# Patient Record
Sex: MALE | Race: WHITE | NOT HISPANIC OR LATINO | URBAN - METROPOLITAN AREA
[De-identification: names, ages, dates, MRNs, and addresses within clinical notes are randomized per-mention and may not be internally consistent; named-entity substitution may affect disease eponyms.]

---

## 2017-08-30 ENCOUNTER — OFFICE (OUTPATIENT)
Dept: URBAN - METROPOLITAN AREA CLINIC 101 | Facility: CLINIC | Age: 62
End: 2017-08-30
Payer: COMMERCIAL

## 2017-08-30 VITALS
HEIGHT: 72 IN | WEIGHT: 205 LBS | SYSTOLIC BLOOD PRESSURE: 116 MMHG | DIASTOLIC BLOOD PRESSURE: 70 MMHG | HEART RATE: 75 BPM | TEMPERATURE: 98.1 F

## 2017-08-30 DIAGNOSIS — K59.09 OTHER CONSTIPATION: ICD-10-CM

## 2017-08-30 DIAGNOSIS — R63.4 ABNORMAL WEIGHT LOSS: ICD-10-CM

## 2017-08-30 DIAGNOSIS — R19.4 CHANGE IN BOWEL HABIT: ICD-10-CM

## 2017-08-30 PROCEDURE — 99214 OFFICE O/P EST MOD 30 MIN: CPT

## 2017-08-30 NOTE — SERVICEHPINOTES
This 60 YO patient is here for unexplained weight loss, altered bowel patterns, and nausea. He has unintentionally lost over 50 pounds in one year. He has lack of appetite, and eats one good meal a day. He has altered bowel patterns and has constipation and diarrhea. He has constipation 50% of the week takes Miralax has diarrhea 50% of the week. He has daily central abdominal , after he eats, 5 of 10. He denies rectal bleeding, family h/o colon cancer. He  had inguinal hernia repair with Dr Walton, and had a anal sphincterotomy and hemorrhoid in 2015. His last colonoscopy was 2011, no polyps, and a papilla noted. He has daily nausea, and has nausea after he eats. No heartburn, vomiting, epigastric pain. He has never had an EGD.

## 2017-09-04 LAB
CDIFF TOXIN ASSAY (PCR): CDIFF DNA PATIENT RESULT: NEGATIVE
GIARDIA ANTIGEN STOOL: NEGATIVE
GIARDIA ANTIGEN STOOL: PERFORMING LOCATION: (no result)
STOOL FOR WBC'S: NORMAL

## 2017-09-06 LAB
OVA + PARASITE EXAM - REF: OVA + PARASITE EXAM: NORMAL
OVA + PARASITE EXAM - REF: PERFORMING LOCATION: (no result)
OVA + PARASITE EXAM - REF: RESULT 1: NORMAL
STOOL CULTURE: (no result)
STOOL CULTURE: NORMAL

## 2017-09-07 LAB
FECAL FAT, QUALITATIVE - REF: FATS, NEUTRAL: NORMAL
FECAL FAT, QUALITATIVE - REF: FATS, TOTAL: NORMAL
PANCREATIC ELASTASE STOOL -REF: 426
PANCREATIC ELASTASE STOOL -REF: PERFORMING LOCATION: (no result)
TSH HIGH SENSITIVITY (3RD GEN): 4.41 MIU/ML
TSH HIGH SENSITIVITY (3RD GEN): PERFORMING LOCATION: (no result)

## 2017-09-11 LAB
CELIAC DISEASE COMP - REF: DEAMIDATED GLIADIN, IGA - REF: 3 UNITS
CELIAC DISEASE COMP - REF: DEAMIDATED GLIADIN, IGG - REF: 3 UNITS
CELIAC DISEASE COMP - REF: ENDOMYSIAL ANTIBODY IGA - REF: NEGATIVE
CELIAC DISEASE COMP - REF: IMMUNOGLOBULIN A, QN, SERUM: 108 MG/DL
CELIAC DISEASE COMP - REF: PERFORMING LOCATION: (no result)
CELIAC DISEASE COMP - REF: T-TRANSGLUTAMINASE IGA - REF: <2 U/ML
CELIAC DISEASE COMP - REF: T-TRANSGLUTAMINASE IGG - REF: 6 U/ML — HIGH

## 2017-10-18 ENCOUNTER — ON CAMPUS - OUTPATIENT (OUTPATIENT)
Dept: URBAN - METROPOLITAN AREA HOSPITAL 35 | Facility: HOSPITAL | Age: 62
End: 2017-10-18
Payer: COMMERCIAL

## 2017-10-18 DIAGNOSIS — R63.4 ABNORMAL WEIGHT LOSS: ICD-10-CM

## 2017-10-18 DIAGNOSIS — R19.4 CHANGE IN BOWEL HABIT: ICD-10-CM

## 2017-10-18 DIAGNOSIS — D12.5 BENIGN NEOPLASM OF SIGMOID COLON: ICD-10-CM

## 2017-10-18 DIAGNOSIS — R10.13 EPIGASTRIC PAIN: ICD-10-CM

## 2017-10-18 PROCEDURE — 45385 COLONOSCOPY W/LESION REMOVAL: CPT

## 2017-10-18 PROCEDURE — 43239 EGD BIOPSY SINGLE/MULTIPLE: CPT

## 2020-02-14 ENCOUNTER — OFFICE (OUTPATIENT)
Dept: URBAN - METROPOLITAN AREA CLINIC 101 | Facility: CLINIC | Age: 65
End: 2020-02-14
Payer: COMMERCIAL

## 2020-02-14 VITALS
SYSTOLIC BLOOD PRESSURE: 102 MMHG | WEIGHT: 201 LBS | HEART RATE: 84 BPM | HEIGHT: 72 IN | TEMPERATURE: 98.2 F | DIASTOLIC BLOOD PRESSURE: 65 MMHG

## 2020-02-14 DIAGNOSIS — R11.0 NAUSEA: ICD-10-CM

## 2020-02-14 DIAGNOSIS — R10.13 EPIGASTRIC PAIN: ICD-10-CM

## 2020-02-14 DIAGNOSIS — K30 FUNCTIONAL DYSPEPSIA: ICD-10-CM

## 2020-02-14 DIAGNOSIS — R19.7 DIARRHEA, UNSPECIFIED: ICD-10-CM

## 2020-02-14 DIAGNOSIS — K63.5 POLYP OF COLON: ICD-10-CM

## 2020-02-14 PROCEDURE — 99214 OFFICE O/P EST MOD 30 MIN: CPT

## 2020-02-14 RX ORDER — FAMOTIDINE 40 MG/1
80 TABLET, FILM COATED ORAL
Qty: 60 | Refills: 5 | Status: COMPLETED
Start: 2020-02-14 | End: 2021-09-24

## 2020-02-14 NOTE — SERVICEHPINOTES
MERY LIRA   is a   64   male who presents with above symptoms. For the last a couple of months, has been experiencing pain at epigastric pain and indigestion. Took Maalox for 3 weeks and it got better.BR+ Nausea without vomiting.  The pain is worse after eating. + Heartburn. BRSlight dysphagia with both solids and liquids, not bothersome. BRCurrently on aspirin 81 mg once daily.For the last couple of days, has been experiencing diarrhea on BSS type 3 or 6 up to 4 times daily. BRTreated for impetigo with Bactrim.  BRReports occasional diarrhea. Denies blood in stool, melena, lower abdominal pain or weight loss. The nephrologist stopped the omeprazole 40 mg due to worsening of kidney functions. Drinks liquor 3 shots daily since 4 years ago. BRSmokes a pack daily over 20 years.  Denies family hx colon cancer. BR

## 2020-12-01 ENCOUNTER — ON CAMPUS - OUTPATIENT (OUTPATIENT)
Dept: URBAN - METROPOLITAN AREA HOSPITAL 16 | Facility: HOSPITAL | Age: 65
End: 2020-12-01
Payer: COMMERCIAL

## 2020-12-01 DIAGNOSIS — Z86.010 PERSONAL HISTORY OF COLONIC POLYPS: ICD-10-CM

## 2020-12-01 DIAGNOSIS — K63.5 POLYP OF COLON: ICD-10-CM

## 2020-12-01 DIAGNOSIS — D12.5 BENIGN NEOPLASM OF SIGMOID COLON: ICD-10-CM

## 2020-12-01 PROCEDURE — 45385 COLONOSCOPY W/LESION REMOVAL: CPT | Mod: PT | Performed by: INTERNAL MEDICINE

## 2021-09-24 ENCOUNTER — OFFICE (OUTPATIENT)
Dept: URBAN - METROPOLITAN AREA CLINIC 102 | Facility: CLINIC | Age: 66
End: 2021-09-24
Payer: COMMERCIAL

## 2021-09-24 VITALS
SYSTOLIC BLOOD PRESSURE: 89 MMHG | TEMPERATURE: 99.1 F | WEIGHT: 191 LBS | DIASTOLIC BLOOD PRESSURE: 67 MMHG | HEART RATE: 109 BPM | HEIGHT: 72 IN

## 2021-09-24 DIAGNOSIS — R63.4 ABNORMAL WEIGHT LOSS: ICD-10-CM

## 2021-09-24 DIAGNOSIS — R19.8 OTHER SPECIFIED SYMPTOMS AND SIGNS INVOLVING THE DIGESTIVE S: ICD-10-CM

## 2021-09-24 DIAGNOSIS — K86.89 OTHER SPECIFIED DISEASES OF PANCREAS: ICD-10-CM

## 2021-09-24 PROCEDURE — 99214 OFFICE O/P EST MOD 30 MIN: CPT | Performed by: INTERNAL MEDICINE

## 2021-11-19 ENCOUNTER — OFFICE (OUTPATIENT)
Dept: URBAN - METROPOLITAN AREA CLINIC 102 | Facility: CLINIC | Age: 66
End: 2021-11-19
Payer: COMMERCIAL

## 2021-11-19 VITALS
HEART RATE: 107 BPM | SYSTOLIC BLOOD PRESSURE: 103 MMHG | HEIGHT: 72 IN | TEMPERATURE: 97.9 F | WEIGHT: 189 LBS | DIASTOLIC BLOOD PRESSURE: 66 MMHG

## 2021-11-19 DIAGNOSIS — R19.8 OTHER SPECIFIED SYMPTOMS AND SIGNS INVOLVING THE DIGESTIVE S: ICD-10-CM

## 2021-11-19 DIAGNOSIS — K86.89 OTHER SPECIFIED DISEASES OF PANCREAS: ICD-10-CM

## 2021-11-19 PROCEDURE — 99214 OFFICE O/P EST MOD 30 MIN: CPT

## 2022-01-26 ENCOUNTER — OFFICE (OUTPATIENT)
Dept: URBAN - METROPOLITAN AREA CLINIC 102 | Facility: CLINIC | Age: 67
End: 2022-01-26
Payer: COMMERCIAL

## 2022-01-26 VITALS
DIASTOLIC BLOOD PRESSURE: 80 MMHG | TEMPERATURE: 97 F | WEIGHT: 197 LBS | HEIGHT: 72 IN | HEART RATE: 70 BPM | SYSTOLIC BLOOD PRESSURE: 127 MMHG

## 2022-01-26 DIAGNOSIS — K86.89 OTHER SPECIFIED DISEASES OF PANCREAS: ICD-10-CM

## 2022-01-26 DIAGNOSIS — R19.8 OTHER SPECIFIED SYMPTOMS AND SIGNS INVOLVING THE DIGESTIVE S: ICD-10-CM

## 2022-01-26 PROCEDURE — 99214 OFFICE O/P EST MOD 30 MIN: CPT

## 2022-01-26 RX ORDER — PANCRELIPASE 36000; 180000; 114000 [USP'U]/1; [USP'U]/1; [USP'U]/1
CAPSULE, DELAYED RELEASE PELLETS ORAL
Qty: 720 | Refills: 3 | Status: ACTIVE
Start: 2021-12-09

## 2022-01-26 NOTE — SERVICEHPINOTES
MERY LIRA   is a   66   male who presents for follow up regarding EPI. He was rx'd creon 36k, initially not able to get due to price. Wife had patient assistance forms but questions were too invasive so they did not complete. However, she called pharmacy and insurance again and is able to get creon now at an affordable price after dedeuctible is reached.
Livia took creon consistently (2 pills w/ meals and 1 with snacks) x 2-3 weeks and noticed a significant difference in symptoms with this. He was no longer complaining of abd pain after eating, no belching, appetite was back, and started to gain weight. BMs also seemed to improve although still alternates b/t constipation and diarrhea. However, due to an issue with rx, is about to run out, only taking 1 pill per day for a couple weeks and has definitely noticed a difference and having returning sx. 
Livia continues to smoke around 1ppd, not motived to quit. Although he has cut back on etoh intake--previously drank  approx 1 pint of bourbon per day but per pt and wife has cut this in half. Is still trying to work on decreasing this. States he mainly drinks secondary to pain from RA. Not diabetic. .

## 2023-04-28 ENCOUNTER — OFFICE (OUTPATIENT)
Dept: URBAN - METROPOLITAN AREA CLINIC 102 | Facility: CLINIC | Age: 68
End: 2023-04-28
Payer: COMMERCIAL

## 2023-04-28 VITALS
SYSTOLIC BLOOD PRESSURE: 109 MMHG | TEMPERATURE: 97.8 F | WEIGHT: 204 LBS | HEART RATE: 64 BPM | HEIGHT: 72 IN | DIASTOLIC BLOOD PRESSURE: 71 MMHG

## 2023-04-28 DIAGNOSIS — N18.9 CHRONIC KIDNEY DISEASE, UNSPECIFIED: ICD-10-CM

## 2023-04-28 DIAGNOSIS — F11.20 OPIOID DEPENDENCE, UNCOMPLICATED: ICD-10-CM

## 2023-04-28 DIAGNOSIS — R53.83 OTHER FATIGUE: ICD-10-CM

## 2023-04-28 DIAGNOSIS — D53.9 NUTRITIONAL ANEMIA, UNSPECIFIED: ICD-10-CM

## 2023-04-28 DIAGNOSIS — R19.7 DIARRHEA, UNSPECIFIED: ICD-10-CM

## 2023-04-28 DIAGNOSIS — F10.20 ALCOHOL DEPENDENCE, UNCOMPLICATED: ICD-10-CM

## 2023-04-28 DIAGNOSIS — K86.89 OTHER SPECIFIED DISEASES OF PANCREAS: ICD-10-CM

## 2023-04-28 DIAGNOSIS — G89.29 OTHER CHRONIC PAIN: ICD-10-CM

## 2023-04-28 DIAGNOSIS — M06.9 RHEUMATOID ARTHRITIS, UNSPECIFIED: ICD-10-CM

## 2023-04-28 DIAGNOSIS — R19.8 OTHER SPECIFIED SYMPTOMS AND SIGNS INVOLVING THE DIGESTIVE S: ICD-10-CM

## 2023-04-28 DIAGNOSIS — Z86.010 PERSONAL HISTORY OF COLONIC POLYPS: ICD-10-CM

## 2023-04-28 PROCEDURE — 99215 OFFICE O/P EST HI 40 MIN: CPT | Performed by: PHYSICIAN ASSISTANT

## 2023-04-28 RX ORDER — PANCRELIPASE 36000; 180000; 114000 [USP'U]/1; [USP'U]/1; [USP'U]/1
CAPSULE, DELAYED RELEASE PELLETS ORAL
Qty: 720 | Refills: 3 | Status: ACTIVE
Start: 2021-12-09

## 2023-04-28 NOTE — SERVICEHPINOTES
Mr. Torres is a 67 YoM with complex medical history including rheumatoid arthritis, chronic pain on opiate therapy, skin cancer, CKD 3, fibromyalgia, sleep apnea, alcohol dependence, colon polyps and pancreatic insufficiency presents to the office at the referral of primary care provider for evaluation of anemia. Patient's wife presents with him, note that she is an RN. Currently patient denies any new or change in GI symptoms. He does chronically have intermittent diarrhea, noted he takes Creon but inconsistently which may be related. He does continue to drink alcohol and caffeinated soda daily. He had significant improvement in abdominal comfort and bloating after starting on pancreatic enzyme replacement.br
CT abd/pelv for pancreatic w/u in 2021 w/ steatosis, pancreatic atrophy, hepatic cyst, but otherwise unremarkable.
br br Recent labs reviewed:br> March/2023: Hgb 11.8, . PLT and WBC WNL. Ferritin and iron panel also WNL. B12 and folate WNL. Elevated reticulocytes. Creatinine 1.64.br> January/2023: Hgb 11.9, .7. Creatinine 1.6, LFTs WNL.EGD 10/2017 was normal, negative biopsies from duodenum.brColonoscopy 10/2017 showed an advanced adenoma in sigmoid colon, +diverticulosis, +internal hemorrhoids.brColonoscopy 12/2020 showed 1 adenomatous polyp, +diverticulosis, +internal hemorrhoids.brRUQ US 2/2020 showed hepatic steatosis but otherwise was unremarkable.
Denies ever having been evaluated by hematology in the past.

## 2023-05-15 LAB — OCCULT BLOOD, FECAL, IA: POSITIVE

## 2023-08-25 ENCOUNTER — ON CAMPUS - OUTPATIENT (OUTPATIENT)
Dept: URBAN - METROPOLITAN AREA HOSPITAL 65 | Facility: HOSPITAL | Age: 68
End: 2023-08-25
Payer: COMMERCIAL

## 2023-08-25 DIAGNOSIS — R19.7 DIARRHEA, UNSPECIFIED: ICD-10-CM

## 2023-08-25 DIAGNOSIS — D53.9 NUTRITIONAL ANEMIA, UNSPECIFIED: ICD-10-CM

## 2023-08-25 DIAGNOSIS — K64.9 UNSPECIFIED HEMORRHOIDS: ICD-10-CM

## 2023-08-25 DIAGNOSIS — K57.30 DIVERTICULOSIS OF LARGE INTESTINE WITHOUT PERFORATION OR ABS: ICD-10-CM

## 2023-08-25 DIAGNOSIS — D12.2 BENIGN NEOPLASM OF ASCENDING COLON: ICD-10-CM

## 2023-08-25 DIAGNOSIS — K29.50 UNSPECIFIED CHRONIC GASTRITIS WITHOUT BLEEDING: ICD-10-CM

## 2023-08-25 DIAGNOSIS — R19.5 OTHER FECAL ABNORMALITIES: ICD-10-CM

## 2023-08-25 DIAGNOSIS — K63.5 POLYP OF COLON: ICD-10-CM

## 2023-08-25 DIAGNOSIS — Z86.010 PERSONAL HISTORY OF COLONIC POLYPS: ICD-10-CM

## 2023-08-25 PROCEDURE — 45380 COLONOSCOPY AND BIOPSY: CPT | Mod: 59 | Performed by: INTERNAL MEDICINE

## 2023-08-25 PROCEDURE — 43239 EGD BIOPSY SINGLE/MULTIPLE: CPT | Performed by: INTERNAL MEDICINE

## 2023-08-25 PROCEDURE — 45385 COLONOSCOPY W/LESION REMOVAL: CPT | Performed by: INTERNAL MEDICINE

## 2023-10-27 ENCOUNTER — OFFICE (OUTPATIENT)
Dept: URBAN - METROPOLITAN AREA CLINIC 102 | Facility: CLINIC | Age: 68
End: 2023-10-27
Payer: COMMERCIAL

## 2023-10-27 VITALS
HEIGHT: 72 IN | TEMPERATURE: 97.6 F | HEART RATE: 51 BPM | DIASTOLIC BLOOD PRESSURE: 81 MMHG | WEIGHT: 202 LBS | SYSTOLIC BLOOD PRESSURE: 144 MMHG

## 2023-10-27 DIAGNOSIS — Z86.010 PERSONAL HISTORY OF COLONIC POLYPS: ICD-10-CM

## 2023-10-27 DIAGNOSIS — K64.8 OTHER HEMORRHOIDS: ICD-10-CM

## 2023-10-27 DIAGNOSIS — K30 FUNCTIONAL DYSPEPSIA: ICD-10-CM

## 2023-10-27 DIAGNOSIS — R19.8 OTHER SPECIFIED SYMPTOMS AND SIGNS INVOLVING THE DIGESTIVE S: ICD-10-CM

## 2023-10-27 DIAGNOSIS — K86.89 OTHER SPECIFIED DISEASES OF PANCREAS: ICD-10-CM

## 2023-10-27 PROCEDURE — 99214 OFFICE O/P EST MOD 30 MIN: CPT | Performed by: PHYSICIAN ASSISTANT

## 2023-10-27 RX ORDER — LOPERAMIDE HYDROCHLORIDE 2 MG/1
TABLET, FILM COATED ORAL
Qty: 180 | Refills: 10 | Status: ACTIVE
Start: 2023-10-27

## 2023-10-27 RX ORDER — PANCRELIPASE 36000; 180000; 114000 [USP'U]/1; [USP'U]/1; [USP'U]/1
CAPSULE, DELAYED RELEASE PELLETS ORAL
Qty: 720 | Refills: 3 | Status: ACTIVE
Start: 2021-12-09

## 2023-10-31 LAB — PANCREATIC ELASTASE, FECAL: <50 UG ELAST./G — LOW
